# Patient Record
Sex: FEMALE | Race: WHITE | NOT HISPANIC OR LATINO | Employment: FULL TIME | URBAN - METROPOLITAN AREA
[De-identification: names, ages, dates, MRNs, and addresses within clinical notes are randomized per-mention and may not be internally consistent; named-entity substitution may affect disease eponyms.]

---

## 2024-08-31 ENCOUNTER — OFFICE VISIT (OUTPATIENT)
Dept: URGENT CARE | Facility: CLINIC | Age: 24
End: 2024-08-31
Payer: COMMERCIAL

## 2024-08-31 VITALS
WEIGHT: 156.2 LBS | OXYGEN SATURATION: 98 % | HEIGHT: 67 IN | SYSTOLIC BLOOD PRESSURE: 100 MMHG | DIASTOLIC BLOOD PRESSURE: 56 MMHG | TEMPERATURE: 98.3 F | RESPIRATION RATE: 16 BRPM | HEART RATE: 65 BPM | BODY MASS INDEX: 24.52 KG/M2

## 2024-08-31 DIAGNOSIS — M77.8 TENDONITIS OF BOTH ELBOWS: Primary | ICD-10-CM

## 2024-08-31 DIAGNOSIS — S46.911A STRAIN OF RIGHT ELBOW, INITIAL ENCOUNTER: ICD-10-CM

## 2024-08-31 RX ORDER — SPIRONOLACTONE 100 MG/1
100 TABLET, FILM COATED ORAL DAILY
COMMUNITY
Start: 2024-06-03

## 2024-08-31 RX ORDER — KETOROLAC TROMETHAMINE 15 MG/ML
15 INJECTION, SOLUTION INTRAMUSCULAR; INTRAVENOUS ONCE
Status: COMPLETED | OUTPATIENT
Start: 2024-08-31 | End: 2024-08-31

## 2024-08-31 RX ORDER — OMEPRAZOLE 40 MG/1
40 CAPSULE, DELAYED RELEASE ORAL DAILY
Qty: 14 CAPSULE | Refills: 0 | Status: SHIPPED | OUTPATIENT
Start: 2024-08-31 | End: 2024-09-14

## 2024-08-31 RX ORDER — PREDNISONE 10 MG/1
40 TABLET ORAL DAILY
Qty: 20 TABLET | Refills: 0 | Status: SHIPPED | OUTPATIENT
Start: 2024-09-01 | End: 2024-09-06

## 2024-08-31 RX ORDER — SERTRALINE HYDROCHLORIDE 100 MG/1
200 TABLET, FILM COATED ORAL
COMMUNITY

## 2024-08-31 RX ADMIN — KETOROLAC TROMETHAMINE 15 MG: 15 INJECTION, SOLUTION INTRAMUSCULAR; INTRAVENOUS at 13:20

## 2024-08-31 ASSESSMENT — ENCOUNTER SYMPTOMS
FEVER: 0
CHILLS: 0
EYE REDNESS: 0
HEADACHES: 0
ABDOMINAL PAIN: 0
STRIDOR: 0
PALPITATIONS: 0
EYE PAIN: 0
SORE THROAT: 0
EYE DISCHARGE: 0
MYALGIAS: 0
SPUTUM PRODUCTION: 0
COUGH: 0
DIARRHEA: 0
NAUSEA: 0
DIZZINESS: 0
WHEEZING: 0
SHORTNESS OF BREATH: 0
VOMITING: 0

## 2024-08-31 NOTE — PROGRESS NOTES
Subjective:   Yady Willard is a 24 y.o. female who presents for Arm Injury (Xright arm injury red/swelling/after cross fit class)          I introduced myself to the patient and informed them that I am a Family Nurse Practitioner.    HPI:Yady is a 24-year-old female who comes in today c/o severe pain to both elbows, biceps, upper forearms, with some swelling to her right elbow. Onset was started 2 days ago, after performing a new vigorous CrossFit class where she did a lot of pull-ups and bicep curls which she was not used to doing.  Patient describes symptoms as constant. They describe the pain as sharp, severe. Aggravating factors include using her arms, flexion and especially extension of her elbows. Relieving factors include none. Treatments tried at home include ibuprofen with no result, last dose was in the early hours of this morning. They describe their symptoms as moderate to severe. She states she just finished her MP, and states there is no chance she could be pregnant presently.  She denies any fever, chills, nausea or vomiting    Review of Systems   Constitutional:  Negative for chills, fever and malaise/fatigue.   HENT:  Negative for congestion, ear pain and sore throat.    Eyes:  Negative for pain, discharge and redness.   Respiratory:  Negative for cough, sputum production, shortness of breath, wheezing and stridor.    Cardiovascular:  Negative for chest pain and palpitations.   Gastrointestinal:  Negative for abdominal pain, diarrhea, nausea and vomiting.   Genitourinary:  Negative for dysuria.   Musculoskeletal:  Positive for joint pain. Negative for myalgias.        Positive for bilateral elbow pain and mild swelling   Skin:  Negative for rash.   Neurological:  Negative for dizziness and headaches.       Medications: sertraline Tabs  spironolactone Tabs     Allergies: Patient has no known allergies.    Problem List: does not have a problem list on file.    Surgical History:  No  "past surgical history on file.    Past Social Hx:   reports that she has never smoked. She has never used smokeless tobacco. She reports current alcohol use. She reports that she does not use drugs.     Past Family Hx:   family history is not on file.     Problem list, medications, and allergies reviewed by myself today in Epic.   I have documented what I find to be significant in regards to past medical, social, family and surgical history  in my HPI or under PMH/PSH/FH review section, otherwise it is noncontributory     Objective:     /56   Pulse 65   Temp 36.8 °C (98.3 °F) (Temporal)   Resp 16   Ht 1.702 m (5' 7\")   Wt 70.9 kg (156 lb 3.2 oz)   LMP 08/25/2024 (Exact Date)   SpO2 98%   BMI 24.46 kg/m²     During this visit, appropriate PPE was worn, and hand hygiene was performed.    Physical Exam  Vitals reviewed.   Constitutional:       General: She is not in acute distress.     Appearance: Normal appearance. She is normal weight. She is not ill-appearing or toxic-appearing.   HENT:      Head: Normocephalic and atraumatic.      Right Ear: External ear normal.      Left Ear: External ear normal.      Nose: No congestion or rhinorrhea.   Eyes:      General: No scleral icterus.        Right eye: No discharge.         Left eye: No discharge.      Extraocular Movements: Extraocular movements intact.      Conjunctiva/sclera: Conjunctivae normal.   Cardiovascular:      Rate and Rhythm: Normal rate.   Pulmonary:      Effort: Pulmonary effort is normal.   Abdominal:      General: There is no distension.   Genitourinary:     Comments: Deferred  Musculoskeletal:         General: Swelling and tenderness present. Normal range of motion.      Right lower leg: No edema.      Left lower leg: No edema.      Comments: Exam of bilateral upper extremities/elbow shows some mild edema around the area of the right elbow, TTP to entire elbow especially antecubital fossa area, bilateral biceps and upper forearms.  There is " no sign of compartment syndrome bilaterally.  Patient does have F ROM although there is pain especially to full extension of her elbows, worse on right.  NVID bilaterally with cap refill to distal digits less than 3 seconds, sensation intact hard and soft, radial and brachial pulses intact 2+.  There is no erythema, induration, warmth to touch, ecchymosis or lymphangitis or any other sign of infection or injury        Skin:     General: Skin is warm and dry.   Neurological:      General: No focal deficit present.      Mental Status: She is alert and oriented to person, place, and time. Mental status is at baseline.   Psychiatric:         Mood and Affect: Mood normal.         Behavior: Behavior normal.       Assessment/Plan:     Diagnosis and associated orders:     1. Tendonitis of both elbows        2. Strain of right elbow, initial encounter           Comments/MDM:     1. Tendonitis of both elbows  - ketorolac (Toradol) 15 MG/ML injection 15 mg  - predniSONE (DELTASONE) 10 MG Tab; Take 4 Tablets by mouth every day for 5 days.  Dispense: 20 Tablet; Refill: 0  - omeprazole (PRILOSEC) 40 MG delayed-release capsule; Take 1 Capsule by mouth every day for 14 days.  Dispense: 14 Capsule; Refill: 0    2. Strain of right elbow, initial encounter  Discussed with patient/family Dx,  DDx, management options (risks,benefits, and alternatives to planned treatment), natural progression.   Questions were encouraged and answered  Supportive care measures were discussed including the following -   RICE  Tylenol and ibuprofen for pain and inflammation, discussed appropriate dosages.  Ace wrap to right elbow, rewrap as needed  Instructed patient ongoing assessment of color, movement, sensation  Discussed red flags and reasons return to urgent care versus when to go to the emergency room for increased pain, loss of color, movement, or           sensation to the digits, any increased redness, swelling, localized heat, especially if  there is fever, chills, nausea and vomiting, lethargy.  We discussed  Compartment syndrome 6 P's    Pain  Pallor  Pulselessness  Paresthesia (pins and needles)  Paralysis  Poikilothermia (cooler than other limb or surrounding areas)  Patient was given strict ER precautions should she get the symptoms  Patient was instructed regarding prednisone, purpose, side effects, precautions, include avoid using any other NSAIDs while taking prednisone, take it first thing in the morning to avoid potential sleeplessness/insomnia, take with food to prevent GI upset.   She states she is prone to GI upset and has a sensitive stomach, we will start her on Prilosec 40 mg daily for the next 14 days  I did offer patient a Toradol shot in clinic today to help relieve pain and inflammation, instructed them regarding purpose, side effects, precautions. They state they understand, and want to go ahead with the dose.  Patient does deny any history of GI bleeding, gastric ulcers, PUD, vomiting blood, blood in stool, or any history of stroke or heart attack in the past. I did keep them in clinic for 10 minutes after the dose, they tolerated well with no adverse reactions before discharge.  I did discuss with patient purpose, side effects, precautions well meds prescribed  discussed with patient/family  that I we will make a referral to sports medicine for follow-up, she declines stating she is here on vacation, will be returning home next week, will follow-up with sports medicine clinic at home    -ketorolac (Toradol) 15 MG/ML injection 15 mg  - predniSONE (DELTASONE) 10 MG Tab; Take 4 Tablets by mouth every day for 5 days.  Dispense: 20 Tablet; Refill: 0  - omeprazole (PRILOSEC) 40 MG delayed-release capsule; Take 1 Capsule by mouth every day for 14 days.  Dispense: 14 Capsule; Refill: 0       Pt is clinically stable at today's acute urgent care visit. Vital signs are normal and reassuring.  No acute distress noted. Appropriate for  outpatient management at this time.        I personally reviewed prior external notes and test results pertinent to today's visit.  I have independently reviewed and interpreted all diagnostics ordered during this urgent care acute visit.        Please note that this dictation was created using voice recognition software. I have made a reasonable attempt to correct obvious errors, but I expect that there are errors of grammar and possibly content that I did not discover before finalizing the note.    This note was electronically signed by Vince CARRERO, MANOLO, TIM, JASIEL